# Patient Record
Sex: MALE | Race: BLACK OR AFRICAN AMERICAN | NOT HISPANIC OR LATINO | Employment: FULL TIME | URBAN - METROPOLITAN AREA
[De-identification: names, ages, dates, MRNs, and addresses within clinical notes are randomized per-mention and may not be internally consistent; named-entity substitution may affect disease eponyms.]

---

## 2021-04-09 ENCOUNTER — HISTORICAL (OUTPATIENT)
Dept: ADMINISTRATIVE | Facility: HOSPITAL | Age: 36
End: 2021-04-09

## 2023-08-31 ENCOUNTER — HOSPITAL ENCOUNTER (EMERGENCY)
Facility: HOSPITAL | Age: 38
Discharge: HOME | End: 2023-08-31
Attending: SPECIALIST
Payer: MEDICAID

## 2023-08-31 VITALS
TEMPERATURE: 98 F | WEIGHT: 190 LBS | HEART RATE: 51 BPM | RESPIRATION RATE: 18 BRPM | BODY MASS INDEX: 28.14 KG/M2 | OXYGEN SATURATION: 98 % | HEIGHT: 69 IN | DIASTOLIC BLOOD PRESSURE: 87 MMHG | SYSTOLIC BLOOD PRESSURE: 146 MMHG

## 2023-08-31 DIAGNOSIS — R52 PAIN: ICD-10-CM

## 2023-08-31 DIAGNOSIS — S93.401A SPRAIN OF RIGHT ANKLE, UNSPECIFIED LIGAMENT, INITIAL ENCOUNTER: Primary | ICD-10-CM

## 2023-08-31 PROCEDURE — 99283 EMERGENCY DEPT VISIT LOW MDM: CPT

## 2023-08-31 RX ORDER — DICLOFENAC SODIUM 50 MG/1
50 TABLET, DELAYED RELEASE ORAL 3 TIMES DAILY PRN
Qty: 21 TABLET | Refills: 0 | Status: SHIPPED | OUTPATIENT
Start: 2023-08-31 | End: 2023-09-07

## 2023-08-31 NOTE — Clinical Note
"Ranjeet Connellye" Sidney was seen and treated in our emergency department on 8/31/2023.  He may return to work on 09/04/2023.       If you have any questions or concerns, please don't hesitate to call.      Maral Mckinney FNP"

## 2023-08-31 NOTE — DISCHARGE INSTRUCTIONS
Ace wrap for support   No weight-bearing for 3 days then partial weight-bearing as tolerated  Ice and elevation.  Diclofenac as needed for pain, take with food  For continued pain and swelling after 1 week follow-up with primary care physician for possible reimaging

## 2023-08-31 NOTE — ED NOTES
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

## 2023-08-31 NOTE — ED PROVIDER NOTES
"Encounter Date: 8/31/2023       History     Chief Complaint   Patient presents with    Fall     Fell in yard today c/o pain to R ankle      37-year-old male presents to ER complaining of right ankle and foot pain after he stepped in a hole in his yd just prior to arrival.  He denies any knee pain.  He denies any previous injury on his ankle or foot.  States he heard something "rip"    The history is provided by the patient. No  was used.     Review of patient's allergies indicates:  No Known Allergies  No past medical history on file.  Past Surgical History:   Procedure Laterality Date    SHOULDER OPEN ROTATOR CUFF REPAIR       No family history on file.  Social History     Tobacco Use    Smoking status: Every Day   Substance Use Topics    Alcohol use: Never    Drug use: Never     Review of Systems   Musculoskeletal:  Positive for arthralgias and joint swelling.   Skin:  Negative for color change and wound.   All other systems reviewed and are negative.      Physical Exam     Initial Vitals [08/31/23 1738]   BP Pulse Resp Temp SpO2   (!) 146/87 (!) 51 18 98.3 °F (36.8 °C) 98 %      MAP       --         Physical Exam    Constitutional: He appears well-developed and well-nourished.   HENT:   Head: Normocephalic.   Eyes: EOM are normal.   Neck: Neck supple.   Cardiovascular:  Intact distal pulses.           Abdominal: He exhibits no distension.   Musculoskeletal:      Cervical back: Neck supple.        Legs:      Neurological: He is alert and oriented to person, place, and time.   Skin: Skin is warm and dry. Capillary refill takes less than 2 seconds. No rash noted. No erythema.   Psychiatric: He has a normal mood and affect.         ED Course   Procedures  Labs Reviewed - No data to display       Imaging Results              X-Ray Ankle Complete Right (Final result)  Result time 08/31/23 17:57:47      Final result by Stalin Dixon MD (08/31/23 17:57:47)                   Impression:      No " "acute osseous abnormality identified.      Electronically signed by: Stalin Dixon  Date:    08/31/2023  Time:    17:57               Narrative:    EXAMINATION:  XR ANKLE COMPLETE 3 VIEW RIGHT    CLINICAL HISTORY:  Pain, unspecified    TECHNIQUE:  Three views    COMPARISON:  None available.    FINDINGS:  Osseous and articular surfaces are unremarkable.  No acute fracture, dislocation or arthritic change.  Position and alignment is satisfactory.  Unremarkable mineralization of the bones.  There is no soft tissue calcification.                                       Medications - No data to display  Medical Decision Making  37-year-old male complaining of right foot and ankle pain after he stepped in a hole in his yd.  Mild swelling over the lateral midfoot and ankle.  No ecchymosis or erythema.  X-rays are negative for acute fracture or dislocation.  Given patient's description of hearing something "rip" this was suggests a possible tendon injury or sprain.  Ace wrap applied and patient given crutches.  Instructed to ice, elevate and follow-up with primary care physician in 5-7 days for continued pain and swelling.    Amount and/or Complexity of Data Reviewed  Radiology: ordered.     Details: No acute fracture or dislocation                               Clinical Impression:   Final diagnoses:  [R52] Pain  [S93.401A] Sprain of right ankle, unspecified ligament, initial encounter (Primary)        ED Disposition Condition    Discharge Stable          ED Prescriptions       Medication Sig Dispense Start Date End Date Auth. Provider    diclofenac (VOLTAREN) 50 MG EC tablet Take 1 tablet (50 mg total) by mouth 3 (three) times daily as needed (pain). 21 tablet 8/31/2023 9/7/2023 Maral Mckinney FNP          Follow-up Information    None          Maral Mckinney FNP  08/31/23 1810    "